# Patient Record
Sex: MALE | Race: WHITE | NOT HISPANIC OR LATINO | Employment: UNEMPLOYED | URBAN - METROPOLITAN AREA
[De-identification: names, ages, dates, MRNs, and addresses within clinical notes are randomized per-mention and may not be internally consistent; named-entity substitution may affect disease eponyms.]

---

## 2020-07-02 ENCOUNTER — HOSPITAL ENCOUNTER (EMERGENCY)
Facility: MEDICAL CENTER | Age: 60
End: 2020-07-03
Attending: EMERGENCY MEDICINE | Admitting: EMERGENCY MEDICINE
Payer: MEDICAID

## 2020-07-02 ENCOUNTER — APPOINTMENT (OUTPATIENT)
Dept: RADIOLOGY | Facility: MEDICAL CENTER | Age: 60
End: 2020-07-02
Attending: EMERGENCY MEDICINE
Payer: MEDICAID

## 2020-07-02 DIAGNOSIS — R00.2 PALPITATIONS: ICD-10-CM

## 2020-07-02 DIAGNOSIS — F10.10 ALCOHOL ABUSE: ICD-10-CM

## 2020-07-02 DIAGNOSIS — E86.0 DEHYDRATION: ICD-10-CM

## 2020-07-02 LAB
BASOPHILS # BLD AUTO: 0.4 % (ref 0–1.8)
BASOPHILS # BLD: 0.05 K/UL (ref 0–0.12)
EKG IMPRESSION: NORMAL
EOSINOPHIL # BLD AUTO: 0.12 K/UL (ref 0–0.51)
EOSINOPHIL NFR BLD: 1.1 % (ref 0–6.9)
ERYTHROCYTE [DISTWIDTH] IN BLOOD BY AUTOMATED COUNT: 46.5 FL (ref 35.9–50)
HCT VFR BLD AUTO: 50.7 % (ref 42–52)
HGB BLD-MCNC: 17.4 G/DL (ref 14–18)
IMM GRANULOCYTES # BLD AUTO: 0.04 K/UL (ref 0–0.11)
IMM GRANULOCYTES NFR BLD AUTO: 0.4 % (ref 0–0.9)
LYMPHOCYTES # BLD AUTO: 2.78 K/UL (ref 1–4.8)
LYMPHOCYTES NFR BLD: 25 % (ref 22–41)
MCH RBC QN AUTO: 33 PG (ref 27–33)
MCHC RBC AUTO-ENTMCNC: 34.3 G/DL (ref 33.7–35.3)
MCV RBC AUTO: 96 FL (ref 81.4–97.8)
MONOCYTES # BLD AUTO: 1.4 K/UL (ref 0–0.85)
MONOCYTES NFR BLD AUTO: 12.6 % (ref 0–13.4)
NEUTROPHILS # BLD AUTO: 6.75 K/UL (ref 1.82–7.42)
NEUTROPHILS NFR BLD: 60.5 % (ref 44–72)
NRBC # BLD AUTO: 0 K/UL
NRBC BLD-RTO: 0 /100 WBC
PLATELET # BLD AUTO: 253 K/UL (ref 164–446)
PMV BLD AUTO: 9.3 FL (ref 9–12.9)
RBC # BLD AUTO: 5.28 M/UL (ref 4.7–6.1)
WBC # BLD AUTO: 11.1 K/UL (ref 4.8–10.8)

## 2020-07-02 PROCEDURE — 80307 DRUG TEST PRSMV CHEM ANLYZR: CPT

## 2020-07-02 PROCEDURE — 84484 ASSAY OF TROPONIN QUANT: CPT

## 2020-07-02 PROCEDURE — 93005 ELECTROCARDIOGRAM TRACING: CPT | Performed by: EMERGENCY MEDICINE

## 2020-07-02 PROCEDURE — 80053 COMPREHEN METABOLIC PANEL: CPT

## 2020-07-02 PROCEDURE — 99284 EMERGENCY DEPT VISIT MOD MDM: CPT

## 2020-07-02 PROCEDURE — 85025 COMPLETE CBC W/AUTO DIFF WBC: CPT

## 2020-07-02 PROCEDURE — 93005 ELECTROCARDIOGRAM TRACING: CPT

## 2020-07-02 RX ORDER — SODIUM CHLORIDE 9 MG/ML
1000 INJECTION, SOLUTION INTRAVENOUS ONCE
Status: COMPLETED | OUTPATIENT
Start: 2020-07-03 | End: 2020-07-03

## 2020-07-03 VITALS
HEIGHT: 77 IN | TEMPERATURE: 98.2 F | BODY MASS INDEX: 22.43 KG/M2 | SYSTOLIC BLOOD PRESSURE: 147 MMHG | DIASTOLIC BLOOD PRESSURE: 84 MMHG | WEIGHT: 190 LBS | OXYGEN SATURATION: 94 % | RESPIRATION RATE: 18 BRPM | HEART RATE: 95 BPM

## 2020-07-03 LAB
ALBUMIN SERPL BCP-MCNC: 4.8 G/DL (ref 3.2–4.9)
ALBUMIN/GLOB SERPL: 1.7 G/DL
ALP SERPL-CCNC: 61 U/L (ref 30–99)
ALT SERPL-CCNC: 28 U/L (ref 2–50)
ANION GAP SERPL CALC-SCNC: 18 MMOL/L (ref 7–16)
AST SERPL-CCNC: 35 U/L (ref 12–45)
BILIRUB SERPL-MCNC: 0.8 MG/DL (ref 0.1–1.5)
BUN SERPL-MCNC: 17 MG/DL (ref 8–22)
CALCIUM SERPL-MCNC: 9.8 MG/DL (ref 8.5–10.5)
CHLORIDE SERPL-SCNC: 101 MMOL/L (ref 96–112)
CO2 SERPL-SCNC: 22 MMOL/L (ref 20–33)
CREAT SERPL-MCNC: 0.92 MG/DL (ref 0.5–1.4)
ETHANOL BLD-MCNC: <10.1 MG/DL (ref 0–10.1)
GLOBULIN SER CALC-MCNC: 2.9 G/DL (ref 1.9–3.5)
GLUCOSE SERPL-MCNC: 99 MG/DL (ref 65–99)
POTASSIUM SERPL-SCNC: 4.1 MMOL/L (ref 3.6–5.5)
PROT SERPL-MCNC: 7.7 G/DL (ref 6–8.2)
SODIUM SERPL-SCNC: 141 MMOL/L (ref 135–145)
TROPONIN T SERPL-MCNC: 57 NG/L (ref 6–19)
TROPONIN T SERPL-MCNC: 58 NG/L (ref 6–19)

## 2020-07-03 PROCEDURE — 700105 HCHG RX REV CODE 258: Performed by: EMERGENCY MEDICINE

## 2020-07-03 PROCEDURE — 84484 ASSAY OF TROPONIN QUANT: CPT

## 2020-07-03 PROCEDURE — 71045 X-RAY EXAM CHEST 1 VIEW: CPT

## 2020-07-03 RX ADMIN — SODIUM CHLORIDE 1000 ML: 9 INJECTION, SOLUTION INTRAVENOUS at 00:00

## 2020-07-03 NOTE — ED PROVIDER NOTES
ED Provider Note    Chief Complaint:   Palpitations    HPI:  Jacob Lerma is a 59 y.o. male who presents with chief complaint of chest discomfort and palpitations.  His symptoms began about 4 hours prior to arrival.  He reports a past medical history significant for hypertension, he states he does have medications at home but he has not been taking them recently.  He states he is going through a stressful period in his life, having recently broken up with his wife.  For the past 4 days he has been drinking heavily, but has not been eating well.  As result, he states he has not felt entirely well for the past 4 days.  He states he has not really had anything of substance to eat, and that he has not had any liquids or fluids other than alcoholic drinks.  He does not experience alcohol withdrawal symptoms.  About 4 hours prior to arrival he had an episode of palpitations and associated chest discomfort.  He did not have any pain that radiated to the jaw or the arm, no localized substernal chest pain.  He describes his symptoms more as a discomfort with breathing.  He has no pain radiating to the thoracic back.  He reports no leg pain or leg swelling, he reports no recent immobilization, lengthy travel, no history of DVT nor pulmonary embolism.  He has not had any recent fevers.  Chest discomfort has resolved on arrival to the emergency department.  He has had some associated nausea without vomiting.  No abdominal pain.  He is unable to identify any exacerbating or alleviating factors.    Review of Systems:  See HPI for pertinent positives and negatives. All other systems negative.    Past Medical History:       Social History:  Social History     Tobacco Use   • Smoking status: Current Every Day Smoker     Packs/day: 1.00     Types: Cigarettes   Substance and Sexual Activity   • Alcohol use: Yes   • Drug use: No   • Sexual activity: Not on file       Surgical History:   has a past surgical history that includes  "other orthopedic surgery; other; and cataract extraction with iol.    Current Medications:  Home Medications    **Home medications have not yet been reviewed for this encounter**         Allergies:  No Known Allergies    Physical Exam:  Vital Signs: /84   Pulse 95   Temp 36.8 °C (98.2 °F) (Temporal)   Resp 18   Ht 1.956 m (6' 5\")   Wt 86.2 kg (190 lb)   SpO2 94%   BMI 22.53 kg/m²   Constitutional: Alert, calm, cooperative  HENT: Normocephalic  Eyes: Pupils equal and reactive, normal conjunctiva  Neck: Supple, normal range of motion, no stridor  Cardiovascular: Extremities are warm and well perfused, no murmur appreciated, normal cardiac auscultation  Pulmonary: No respiratory distress, normal work of breathing, no accessory muscule usage, breath sounds clear and equal bilaterally, no wheezing, no coarse breath sounds  Abdomen: Soft, non-distended, non-tender to palpation, no peritoneal signs  Skin: Warm, dry, no rashes or lesions  Musculoskeletal: Normal range of motion in all extremities, no swelling or deformity noted, no lower extremity edema, no calf or posterior thigh tenderness to palpation  Neurologic: Alert, oriented, normal speech, normal motor function  Psychiatric: Normal and appropriate mood and affect    Medical records reviewed for continuity of care.  No recent visits to this facility.    EKG: Rate 109, sinus tachycardia, no ST elevation or depression, no ectopy    Labs:  Labs Reviewed   CBC WITH DIFFERENTIAL - Abnormal; Notable for the following components:       Result Value    WBC 11.1 (*)     Monos (Absolute) 1.40 (*)     All other components within normal limits   COMP METABOLIC PANEL - Abnormal; Notable for the following components:    Anion Gap 18.0 (*)     All other components within normal limits   TROPONIN - Abnormal; Notable for the following components:    Troponin T 57 (*)     All other components within normal limits   TROPONIN - Abnormal; Notable for the following " components:    Troponin T 58 (*)     All other components within normal limits   DIAGNOSTIC ALCOHOL   ESTIMATED GFR   URINALYSIS,CULTURE IF INDICATED       Radiology:  DX-CHEST-PORTABLE (1 VIEW)   Final Result      No acute cardiac or pulmonary abnormalities are identified.           ED Medications Administered:  Medications   NS infusion 1,000 mL (0 mL Intravenous Stopped 7/3/20 0250)       Differential diagnosis:  Dehydration, electrolyte abnormality, alcoholic ketoacidosis, ACS, pneumonia, viral illness    MDM:  Patient presents with chief complaint of palpitations and chest discomfort as documented above.  On arrival to the emergency department he is chest pain-free.  He states he does feel as though his heart is beating quickly, and he is in fact mildly tachycardic on arrival with heart rate in the low 100s.  He denies any stimulant use.  He has no tremor on physical exam, no hallucinations, no evidence of alcohol withdrawal.  He has no cardiac history.  Cardiac risk factors include hypertension and smoking history.  He has no risk factors for DVT nor pulmonary embolism.    On laboratory evaluation his white blood count is just above normal at 11.1.  He is afebrile, this is less concerning for severe infection, Sirs, or sepsis. Anion gap is slightly elevated to 18, CMP is otherwise unremarkable.  Diagnostic alcohol is negative.  High-sensitivity troponin is 59.  Repeat 2-hour troponin is 58.    Chest x-ray is negative for acute process.    IV fluid bolus given on arrival for tachycardia, due to vital sign abnormalities oral fluid challenges and appropriate, rapid rehydration is necessary due to significant vital sign abnormalities.  On reassessment after receiving IV fluids, heart rate has improved.    His lab work and imaging are reassuring.  He was given a meal as well as oral fluids and remained asymptomatic throughout his stay in the emergency department.  Repeat high-sensitivity troponin was performed 6  hours after his episode of palpitations, I do not believe this represents acute coronary syndrome.  Troponin is not trending upward.  His HEART score is 3 indicating low risk of major adverse cardiac event.  At this time, I do believe he is stable for discharge home with close outpatient cardiology follow-up.  He is provided with follow-up information for cardiology, counseled to call in the morning to schedule a close recheck appointment. Return precautions were discussed with the patient, and provided in written form with the patient's discharge instructions.     Blood pressure today is greater than 120/80, patient is instructed to follow up with primary care provider for blood pressure recheck.    Personal protective equipment including N95 surgical respirator, goggles, and gloves were used during this encounter.     Disposition:  Discharge home in stable condition    Final Impression:  1. Palpitations    2. Dehydration    3. Alcohol abuse        Electronically signed by: Zoila Gusman MD, 7/3/2020 4:38 AM

## 2020-07-03 NOTE — ED TRIAGE NOTES
"Pt BIB EMS for CP and weakness.  Pt relates being out of BP meds.  Pt relates he has cirrhosis.  Pt endorses drinking ETOH tonight and feeling \"crushing\" CP rated \"2/10\" and weakness.  Pt relates no food or drink lately besides ETOH.  Pt on cell phone entire time he is being triaged.  Finger stick - 110  "

## 2020-07-03 NOTE — DISCHARGE INSTRUCTIONS
Please call your primary care doctor tomorrow for complete recheck within 24 hours.  You may contact the cardiology clinic at the number listed above to schedule a follow-up appointment for complete recheck.  Please let them know you were seen in the emergency department today.  Return to the emergency department if you develop any new or worsening symptoms including recurrent chest pain, shortness of breath, fevers, cough, leg pain or leg swelling, lightheadedness, nausea, or any further concerns.  If your symptoms have not completely improved within 8-12 hours and you are not able to follow-up with primary care, please return to the emergency department for complete recheck.